# Patient Record
(demographics unavailable — no encounter records)

---

## 2025-04-01 NOTE — PHYSICAL EXAM
[2+] : left foot dorsalis pedis 2+ [Vibration Dec.] : diminished vibratory sensation at the level of the toes [Delayed in the Right Toes] : capillary refills normal in right toes [Delayed in the Left Toes] : capillary refills normal in the left toes [FreeTextEntry3] : Hair growth noted on digits. Proximal to distal cooling is within normal limits.  [de-identified] : He has prominence at the hallux IPJ with exostosis that causes a keratosis. He has minor neuropathy. [FreeTextEntry1] : No weakness or intrinsic atrophy.

## 2025-04-01 NOTE — PHYSICAL EXAM
[2+] : left foot dorsalis pedis 2+ [Vibration Dec.] : diminished vibratory sensation at the level of the toes [Delayed in the Right Toes] : capillary refills normal in right toes [Delayed in the Left Toes] : capillary refills normal in the left toes [FreeTextEntry3] : Hair growth noted on digits. Proximal to distal cooling is within normal limits.  [de-identified] : He has prominence at the hallux IPJ with exostosis that causes a keratosis. He has minor neuropathy. [FreeTextEntry1] : No weakness or intrinsic atrophy.

## 2025-04-01 NOTE — HISTORY OF PRESENT ILLNESS
[FreeTextEntry1] : Patient presents today with right hallux keratosis. He is borderline diabetic and has some numbness. He does have a history of lumbar issues. He has tried soaking with Epsom salt and uses a pumice stone.

## 2025-04-01 NOTE — ASSESSMENT
[FreeTextEntry1] : Impression: Keratosis. Peripheral neuropathy. Flatfoot.  Treatment: I gave him a series of home therapy and stretching exercises. I trimmed the keratosis after prepping with alcohol. I applied moleskin. I gave him the [employer] catalog and discussed shoe gear. Follow-up in the office with continued pain that persists. I want him to use Aquaphor to cut down on friction.
